# Patient Record
Sex: MALE | URBAN - METROPOLITAN AREA
[De-identification: names, ages, dates, MRNs, and addresses within clinical notes are randomized per-mention and may not be internally consistent; named-entity substitution may affect disease eponyms.]

---

## 2021-05-12 ENCOUNTER — TELEPHONE (OUTPATIENT)
Dept: INTERNAL MEDICINE CLINIC | Age: 26
End: 2021-05-12

## 2021-05-12 NOTE — TELEPHONE ENCOUNTER
Mailed patient no show appointment letter #1 for the date of 05/11/2021 [address] Yanique Pond. Also contacted patient as well to inform, in the midst of trying to reschedule patient phone disconnected, tried to call patient again but was unable to get through.